# Patient Record
Sex: FEMALE | Race: WHITE | NOT HISPANIC OR LATINO | Employment: STUDENT | URBAN - METROPOLITAN AREA
[De-identification: names, ages, dates, MRNs, and addresses within clinical notes are randomized per-mention and may not be internally consistent; named-entity substitution may affect disease eponyms.]

---

## 2024-05-14 ENCOUNTER — THERAPY VISIT (OUTPATIENT)
Dept: PHYSICAL THERAPY | Facility: CLINIC | Age: 34
End: 2024-05-14
Payer: COMMERCIAL

## 2024-05-14 DIAGNOSIS — M25.561 RIGHT ANTERIOR KNEE PAIN: Primary | ICD-10-CM

## 2024-05-14 PROCEDURE — 97110 THERAPEUTIC EXERCISES: CPT | Mod: GP

## 2024-05-14 PROCEDURE — 97161 PT EVAL LOW COMPLEX 20 MIN: CPT | Mod: GP

## 2024-05-14 ASSESSMENT — ACTIVITIES OF DAILY LIVING (ADL)
KNEEL ON THE FRONT OF YOUR KNEE: ACTIVITY IS NOT DIFFICULT
KNEE_ACTIVITY_OF_DAILY_LIVING_SCORE: 92.86
PAIN: I HAVE THE SYMPTOM BUT IT DOES NOT AFFECT MY ACTIVITY
AS_A_RESULT_OF_YOUR_KNEE_INJURY,_HOW_WOULD_YOU_RATE_YOUR_CURRENT_LEVEL_OF_DAILY_ACTIVITY?: NORMAL
WALK: ACTIVITY IS NOT DIFFICULT
GO DOWN STAIRS: ACTIVITY IS NOT DIFFICULT
WEAKNESS: I DO NOT HAVE THE SYMPTOM
HOW_WOULD_YOU_RATE_THE_OVERALL_FUNCTION_OF_YOUR_KNEE_DURING_YOUR_USUAL_DAILY_ACTIVITIES?: NEARLY NORMAL
SIT WITH YOUR KNEE BENT: ACTIVITY IS NOT DIFFICULT
HOW_WOULD_YOU_RATE_THE_OVERALL_FUNCTION_OF_YOUR_KNEE_DURING_YOUR_USUAL_DAILY_ACTIVITIES?: NEARLY NORMAL
AS_A_RESULT_OF_YOUR_KNEE_INJURY,_HOW_WOULD_YOU_RATE_YOUR_CURRENT_LEVEL_OF_DAILY_ACTIVITY?: NORMAL
LIMPING: I DO NOT HAVE THE SYMPTOM
SWELLING: I HAVE THE SYMPTOM BUT IT DOES NOT AFFECT MY ACTIVITY
SQUAT: ACTIVITY IS MINIMALLY DIFFICULT
SIT WITH YOUR KNEE BENT: ACTIVITY IS NOT DIFFICULT
STIFFNESS: I HAVE THE SYMPTOM BUT IT DOES NOT AFFECT MY ACTIVITY
KNEE_ACTIVITY_OF_DAILY_LIVING_SUM: 65
STAND: ACTIVITY IS NOT DIFFICULT
GO DOWN STAIRS: ACTIVITY IS NOT DIFFICULT
PAIN: I HAVE THE SYMPTOM BUT IT DOES NOT AFFECT MY ACTIVITY
HOW_WOULD_YOU_RATE_THE_CURRENT_FUNCTION_OF_YOUR_KNEE_DURING_YOUR_USUAL_DAILY_ACTIVITIES_ON_A_SCALE_FROM_0_TO_100_WITH_100_BEING_YOUR_LEVEL_OF_KNEE_FUNCTION_PRIOR_TO_YOUR_INJURY_AND_0_BEING_THE_INABILITY_TO_PERFORM_ANY_OF_YOUR_USUAL_DAILY_ACTIVITIES?: 90
STAND: ACTIVITY IS NOT DIFFICULT
GIVING WAY, BUCKLING OR SHIFTING OF KNEE: I DO NOT HAVE THE SYMPTOM
WALK: ACTIVITY IS NOT DIFFICULT
SWELLING: I HAVE THE SYMPTOM BUT IT DOES NOT AFFECT MY ACTIVITY
LIMPING: I DO NOT HAVE THE SYMPTOM
GIVING WAY, BUCKLING OR SHIFTING OF KNEE: I DO NOT HAVE THE SYMPTOM
SQUAT: ACTIVITY IS MINIMALLY DIFFICULT
RISE FROM A CHAIR: ACTIVITY IS NOT DIFFICULT
WEAKNESS: I DO NOT HAVE THE SYMPTOM
PLEASE_INDICATE_YOR_PRIMARY_REASON_FOR_REFERRAL_TO_THERAPY:: KNEE
HOW_WOULD_YOU_RATE_THE_CURRENT_FUNCTION_OF_YOUR_KNEE_DURING_YOUR_USUAL_DAILY_ACTIVITIES_ON_A_SCALE_FROM_0_TO_100_WITH_100_BEING_YOUR_LEVEL_OF_KNEE_FUNCTION_PRIOR_TO_YOUR_INJURY_AND_0_BEING_THE_INABILITY_TO_PERFORM_ANY_OF_YOUR_USUAL_DAILY_ACTIVITIES?: 90
GO UP STAIRS: ACTIVITY IS MINIMALLY DIFFICULT
RAW_SCORE: 65
STIFFNESS: I HAVE THE SYMPTOM BUT IT DOES NOT AFFECT MY ACTIVITY
KNEEL ON THE FRONT OF YOUR KNEE: ACTIVITY IS NOT DIFFICULT
GO UP STAIRS: ACTIVITY IS MINIMALLY DIFFICULT
RISE FROM A CHAIR: ACTIVITY IS NOT DIFFICULT

## 2024-05-14 NOTE — PROGRESS NOTES
PHYSICAL THERAPY EVALUATION  Type of Visit: Evaluation    See electronic medical record for Abuse and Falls Screening details.    Subjective       Presenting condition or subjective complaint: knee pain  Patient presents self-referred to physical therapy with complaints of right>left knee pain. No history of knee issues until this last December. She started doing kickboxing work out videos at home that involved a lot of squatting, lunging, jumping. Knees would be fine during the exercises, but would then hurt sitting afterward. Has since added in more strength training, yoga, less kickboxing videos, decreasing ROM with squats. Joined kickboxing gym, they have helped tweaked her form, this has also helped. Would like to be able to continue to exercise at a high intensity without knee pain, would like to know if there are things to avoid or other things to work on.     Date of onset:      Relevant medical history:     Dates & types of surgery: breast reduction surgery 2014    Prior diagnostic imaging/testing results:       Prior therapy history for the same diagnosis, illness or injury: No      Prior Level of Function  Transfers: Independent  Ambulation: Independent  ADL: Independent  IADL:  Independent    Living Environment  Social support: With a significant other or spouse   Type of home: House; Multi-level; Basement   Stairs to enter the home: Yes       Ramp: No   Stairs inside the home: Yes 20 Is there a railing: Yes   Help at home: None  Equipment owned:       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  Hobbies/Interests: exercize, travel, reading, movies    Patient goals for therapy: walk up stairs and run without pain, expand my ability to exercize withiut pain afterward    Pain assessment: See objective evaluation for additional pain details     Objective   KEY FINDINGS:  Impaired SL squat form  Good LE strength  Full and pain free knee ROM    KNEE EVALUATION  PAIN:  Pain Level at Rest: 0/10  Pain Level with Use: 4/10  Pain Location: around knee cap  Pain Quality: Aching and Sharp  Other symptoms: some clicking/grinding on left knee  Pain Frequency: intermittent  Time dependent: no  Pain is Exacerbated By: going up stairs, running without warming up  Pain is Relieved By: cold  Pain Progression: Improved since stopping kickboxing videos and adding more variety into exercises  INTEGUMENTARY (edema, incisions):   No joint effusion noted  POSTURE:   GAIT:  Weightbearing Status:   Assistive Device(s):   Gait Deviations: WNL  BALANCE/PROPRIOCEPTION: WNL  WEIGHTBEARING ALIGNMENT: WNL, mild pes planus B  NON-WEIGHTBEARING ALIGNMENT:   ROM:   Lower Extremity ROM   Left (PROM) Left (AROM) Right (PROM) Right (AROM)   Hip Flex WNL  WNL    Hip Ext       Hip ER WNL  WNL    Hip IR WNL  WNL    Hip ABD       Hip ADD       Knee ROM  5-0-140, no pain OP  5-0-140, no pain OP     STRENGTH:   Lower Extremity Muscle Strength   Left Right   Hip Flex 5 5   Hip Ext 5 5   Hip ER 5- 5-   Hip IR 5- 5-   Hip ABD 5 5   Hip ADD     Knee Ext 5 5   Knee Flex 5 5   Quad Set Normal Normal   *indicates pain    FLEXIBILITY:  quad and hamstring WNL  SPECIAL TESTS:  Negative patellar compression, varus/vaglus and McMurrays  FUNCTIONAL TESTS: Double Leg Squat: Good technique/no significant findings, Able to perform full deep squat without pain, and however typically avoids deep squats as they are bothersome with repetition  Single Leg Squat: Anterior knee translation, Knee valgus, Hip internal rotation, Improper use of glutes/hips, and mild discomfort R knee  PALPATION: WNL  JOINT MOBILITY:  normal patellar tracking w/ OKC knee extension, mild lateral tilt noted R>L patella      Assessment & Plan   CLINICAL IMPRESSIONS  Medical Diagnosis:      Treatment Diagnosis: R>L anterior knee pain   Impression/Assessment: Patient is a 34 year old female with R knee complaints.  The following significant findings have been  identified: Pain, Decreased ROM/flexibility, Decreased strength, Decreased proprioception, and Decreased activity tolerance. These impairments interfere with their ability to perform recreational activities, household chores, household mobility, and community mobility as compared to previous level of function.     Clinical Decision Making (Complexity):  Clinical Presentation: Stable/Uncomplicated  Clinical Presentation Rationale: based on medical and personal factors listed in PT evaluation  Clinical Decision Making (Complexity): Low complexity    PLAN OF CARE  Treatment Interventions:  Modalities: Dry Needling  Interventions: Gait Training, Manual Therapy, Neuromuscular Re-education, Therapeutic Activity, Therapeutic Exercise, Self-Care/Home Management    Long Term Goals     PT Goal 1  Goal Identifier: Exercise  Goal Description: Patient will be able to perform full exercise routine without modification without knee pain in order to maintain healthy and active lifestyle  Target Date: 07/09/24  PT Goal 2  Goal Identifier: Stairs  Goal Description: Patient will be able to ascend stairs w/o knee pain, reciprocal pattern, no use of rail  Rationale: to maximize safety and independence with performance of ADLs and functional tasks;to maximize safety and independence within the home;to maximize safety and independence within the community  Target Date: 07/09/24      Frequency of Treatment: 1x/week  Duration of Treatment: 8 weeks    Recommended Referrals to Other Professionals:  none  Education Assessment:   Learner/Method: Patient;Demonstration;Pictures/Video;No Barriers to Learning    Risks and benefits of evaluation/treatment have been explained.   Patient/Family/caregiver agrees with Plan of Care.     Evaluation Time:     PT Eval, Low Complexity Minutes (83935): 25       Signing Clinician: Janette Gomez PT

## 2024-05-21 ENCOUNTER — THERAPY VISIT (OUTPATIENT)
Dept: PHYSICAL THERAPY | Facility: CLINIC | Age: 34
End: 2024-05-21
Payer: COMMERCIAL

## 2024-05-21 DIAGNOSIS — M25.561 RIGHT ANTERIOR KNEE PAIN: Primary | ICD-10-CM

## 2024-05-21 PROCEDURE — 97110 THERAPEUTIC EXERCISES: CPT | Mod: GP

## 2024-06-05 ENCOUNTER — THERAPY VISIT (OUTPATIENT)
Dept: PHYSICAL THERAPY | Facility: CLINIC | Age: 34
End: 2024-06-05
Payer: COMMERCIAL

## 2024-06-05 DIAGNOSIS — M25.561 RIGHT ANTERIOR KNEE PAIN: Primary | ICD-10-CM

## 2024-06-05 PROCEDURE — 97110 THERAPEUTIC EXERCISES: CPT | Mod: GP

## 2024-06-18 ENCOUNTER — THERAPY VISIT (OUTPATIENT)
Dept: PHYSICAL THERAPY | Facility: CLINIC | Age: 34
End: 2024-06-18
Payer: COMMERCIAL

## 2024-06-18 DIAGNOSIS — M25.561 RIGHT ANTERIOR KNEE PAIN: Primary | ICD-10-CM

## 2024-06-18 PROCEDURE — 97110 THERAPEUTIC EXERCISES: CPT | Mod: GP

## 2024-07-16 ENCOUNTER — THERAPY VISIT (OUTPATIENT)
Dept: PHYSICAL THERAPY | Facility: CLINIC | Age: 34
End: 2024-07-16
Payer: COMMERCIAL

## 2024-07-16 DIAGNOSIS — M25.561 RIGHT ANTERIOR KNEE PAIN: Primary | ICD-10-CM

## 2024-07-16 PROCEDURE — 97110 THERAPEUTIC EXERCISES: CPT | Mod: GP

## 2024-07-16 NOTE — PROGRESS NOTES
07/16/24 0500   Appointment Info   Signing clinician's name / credentials Janette Gomez DPT/Mor Schwarz, SPT   Total/Authorized Visits 8 POC   Visits Used 5   PT Tx Diagnosis R>L anterior knee pain   Quick Adds Self Referred   Self Referred   Self Referred (PT) Yes   MD order needed by: 8/12/2024   Progress Note/Certification   Start of Care Date 05/14/24   Therapy Frequency 1x/week   Predicted Duration 8 weeks   Progress Note Due Date 07/13/24   Progress Note Completed Date 05/14/24   GOALS   PT Goals 2   PT Goal 1   Goal Identifier Exercise   Goal Description Patient will be able to perform full exercise routine without modification without knee pain in order to maintain healthy and active lifestyle   Goal Progress met   Target Date 07/09/24   Date Met 07/16/24   PT Goal 2   Goal Identifier Stairs   Goal Description Patient will be able to ascend stairs w/o knee pain, reciprocal pattern, no use of rail   Rationale to maximize safety and independence with performance of ADLs and functional tasks;to maximize safety and independence within the home;to maximize safety and independence within the community   Target Date 07/09/24   Date Met 06/18/24   Subjective Report   Subjective Report Has been keeping track of her exercises that she has been doing to try and narrow down what has been aggravating her knee. She beleives it is from increasing weight on her workouts. Still frustrated with the pain because she feels that she should be able to do more reps and weight. She has been continuing with her standard weight or adding a few pounds or reps to increase the load. Has had trouble getting in the PT workputs due to trips and getting busy. Sumo squats are the only thing she did that day that was out of the ordinary that caused more pain.   Objective Measures   Objective Measures Objective Measure 1   Objective Measure 1   Details Pt demonstrated slight valgus on R and L with single leg squat with no pain.  Double leg squat was good with no pain.   Treatment Interventions (PT)   Interventions Therapeutic Procedure/Exercise   Therapeutic Procedure/Exercise   Therapeutic Procedures: strength, endurance, ROM, flexibility minutes (43911) 25   Ther Proc 1 Prone hip extension with bent knee   Ther Proc 1 - Details x5 reps bilaterally. VC for proper form and where the contraction should be felt.   PTRx Ther Proc 1 Wall Sit   PTRx Ther Proc 1 - Details VR   PTRx Ther Proc 2 Lateral Step Down   PTRx Ther Proc 2 - Details VR - edu to add depth or weight   PTRx Ther Proc 3 Dumbbell Lunges - Split Squats   PTRx Ther Proc 3 - Details discussed   PTRx Ther Proc 4 Lateral Lunges   PTRx Ther Proc 4 - Details VR   PTRx Ther Proc 5 Functional Lunge Backward   PTRx Ther Proc 5 - Details VR   PTRx Ther Proc 6 Split Squat Goblet with One Kettlebell   PTRx Ther Proc 6 - Details VR   PTRx Ther Proc 7 Sumo Squats   PTRx Ther Proc 7 - Details VR   PTRx Ther Proc 8 Single Leg Bridge   PTRx Ther Proc 8 - Details VR   PTRx Ther Proc 9 Hip Thrust   PTRx Ther Proc 9 - Details Reviewed, discussed change in weight distribution to help with comfort.   PTRx Ther Proc 10 Congolese Deadlift Single    PTRx Ther Proc 10 - Details 10x ROSALVA 10#. Pt was curious on form, cueing and instruction for how to hang onto the weight.   PTRx Ther Proc 11 Deadlift with Barbell   PTRx Ther Proc 11 - Details 10x 20# w/ dumbbells 10x w/ bar from raised height cues for form   PTRx Ther Proc 12 Congolese Deadlift   PTRx Ther Proc 12 - Details Reviewed   PTRx Ther Proc 13 Side Stepping With Theraband   PTRx Ther Proc 13 - Details VR   PTRx Ther Proc 14 Reverse Clamshell   PTRx Ther Proc 14 - Details x10. Pt required VC for proper mechanics   PTRx Ther Proc 15 Clamshell with Theraband   PTRx Ther Proc 15 - Details SL clam bridges 10x ROSALVA   PTRx Ther Proc 16 Hip AROM Standing Abduction   PTRx Ther Proc 16 - Details NT   PTRx Ther Proc 17 Soft Tissue Unloading   PTRx Ther Proc 17 -  "Details NT   Skilled Intervention Education on purpose/benefit of HEP based on patient history and exam findings. Patient should not push into pain with exercises, education on how to adjust program based on how it feels and goals of strengthening/ROM for increased function and pain relief. Cues for proper positioning.   Ther Proc 9 Hip extension on table   Ther Proc 9 - Details x10 reps completed bilaterally. VC for form   Ther Proc 10 Sinhala Split Squats   Ther Proc 10 - Details x10 reps bilaterally   Therapeutic Procedures Ther Proc 9;Ther Proc 10   Therapeutic Activity   PTRx Ther Act 1 Patella Taping Preparation   PTRx Ther Act 1 - Details NT   PTRx Ther Act 2 Education Sheet General   PTRx Ther Act 2 - Details Strength training principles:-You can gain strength by doing an exercise/training a muscle group 2x/week-General set/rep scheme: 3-4 sets of 8-15 reps with rest breaks between sets. You may fully rest (i.e. sit/drink water) or \"super set\" your exercises and do one lower body exercise and then one upper body exercise while you rest your legs/vice versa.-For most efficient training/strength gains you should be tired at the end of each set. If huge strength gains/muscle hypertrophy are not your top priority then I would suggest at least being fatigued by the end of your third set. -An effective way to monitor workload is RPE (rate of perceived exertion) on a 0-10 scale 10 being impossible for you to work any harder or do another rep. Ideally you would be around 6-8/10 by the end of your sets assuming this is not impacting your knee pain. -I would chose 2 squat/lunge variations 1-2 hip hinge variations and 2 accessory exercise for a leg workout.    Education   Learner/Method Patient;Demonstration;Pictures/Video;No Barriers to Learning   Plan   Home program online   Updates to plan of care added Armenian split squats and glute kickbacks   Plan for next session discharge   Total Session Time   Timed Code " Treatment Minutes 25   Total Treatment Time (sum of timed and untimed services) 25       DISCHARGE  Reason for Discharge: Patient has met all goals.    Equipment Issued: None    Discharge Plan: Patient to continue home program.    Referring Provider:  Referred Self

## 2024-11-25 ENCOUNTER — THERAPY VISIT (OUTPATIENT)
Dept: PHYSICAL THERAPY | Facility: CLINIC | Age: 34
End: 2024-11-25
Payer: COMMERCIAL

## 2024-11-25 DIAGNOSIS — M25.511 ACUTE PAIN OF RIGHT SHOULDER: Primary | ICD-10-CM

## 2024-11-25 PROCEDURE — 97161 PT EVAL LOW COMPLEX 20 MIN: CPT | Mod: GP | Performed by: PHYSICAL THERAPIST

## 2024-11-25 PROCEDURE — 97112 NEUROMUSCULAR REEDUCATION: CPT | Mod: GP | Performed by: PHYSICAL THERAPIST

## 2024-11-25 ASSESSMENT — ACTIVITIES OF DAILY LIVING (ADL)
TOUCHING_THE_BACK_OF_YOUR_NECK: 0
REMOVING_SOMETHING_FROM_YOUR_BACK_POCKET: 1
WASHING_YOUR_BACK: 1
REACHING_FOR_SOMETHING_ON_A_HIGH_SHELF: 2
AT_ITS_WORST?: 4
PUTTING_ON_A_SHIRT_THAT_BUTTONS_DOWN_THE_FRONT: 0
CARRYING_A_HEAVY_OBJECT_OF_10_POUNDS: 0
PUTTING_ON_AN_UNDERSHIRT_OR_A_PULLOVER_SWEATER: 2
PLEASE_INDICATE_YOR_PRIMARY_REASON_FOR_REFERRAL_TO_THERAPY:: SHOULDER
PUTTING_ON_YOUR_PANTS: 0
WHEN_LYING_ON_THE_INVOLVED_SIDE: 1
PUSHING_WITH_THE_INVOLVED_ARM: 2
PLACING_AN_OBJECT_ON_A_HIGH_SHELF: 2
WASHING_YOUR_HAIR?: 1

## 2024-11-25 NOTE — PROGRESS NOTES
PHYSICAL THERAPY EVALUATION  Type of Visit: Evaluation              Subjective         Presenting condition or subjective complaint: (Patient-Rptd) shoulder pain. Pt presents to PT with a chief complaint of R anterior-lateral shoulder pain with reported onset with doing push ups on 10/9/24 ( style, arms close to body). Pain worsened later  that week with another pushing exercise. Pt avoided pushing exercises with gradual improvement in pain during daily activities, however patient still unable to engage in strength training. Pain worse with reaching across body, pushing, and lifting overhead.     Date of onset: 10/09/24    Relevant medical history:     Dates & types of surgery: (Patient-Rptd) brest reduction surgery 2014    Prior diagnostic imaging/testing results:       Prior therapy history for the same diagnosis, illness or injury: (Patient-Rptd) No          Living Environment  Social support: (Patient-Rptd) With a significant other or spouse   Type of home: (Patient-Rptd) House   Stairs to enter the home:         Ramp: (Patient-Rptd) No   Stairs inside the home: (Patient-Rptd) Yes (Patient-Rptd) 25 Is there a railing: (Patient-Rptd) Yes     Help at home: (Patient-Rptd) None  Equipment owned:       Employment: (Patient-Rptd) Yes (Patient-Rptd) research assistant,graduate instructor  Hobbies/Interests: (Patient-Rptd) travel,books, movies    Patient goals for therapy: (Patient-Rptd) return to regular upper body exercise routine,eliminte pain forreguar daily activities    Pain assessment: See objective evaluation for additional pain details     Objective   SHOULDER EVALUATION  PAIN: Pain Level at Rest: 1/10  Pain Level with Use: 4/10  Pain Location: R lateral and R anterior shoulder  Pain Quality: Aching and Sharp  Pain Frequency: intermittent sharp, occasional ache  Pain is Worst: nighttime  Pain is Exacerbated By: reaching across body, lifting overhead, pushing  Pain is Relieved By: cold and rest  Pain  Progression: Improved  INTEGUMENTARY (edema, incisions):   POSTURE: Sitting Posture: Forward head  GAIT:   Weightbearing Status:   Assistive Device(s):   Gait Deviations:   BALANCE/PROPRIOCEPTION:   WEIGHTBEARING ALIGNMENT:   ROM:   (Degrees) Left AROM Left PROM Right AROM  Right PROM   Shoulder Flexion WNL  WNL    Shoulder Extension       Shoulder Abduction WNL  WNL, pain + at end range     Shoulder Adduction       Shoulder Internal Rotation WNL   WNL, pain + at end range     Shoulder External Rotation WNL   WNL    Shoulder Horizontal Abduction       Shoulder Horizontal Adduction       Shoulder Flexion ER       Shoulder Flexion IR       Elbow Extension       Elbow Flexion       Pain:   End feel:     STRENGTH:   Pain: - none + mild ++ moderate +++ severe  Strength Scale: 0-5/5 Left Right   Shoulder Flexion 5 5, + (mild)   Shoulder Extension     Shoulder Abduction 5 5, + (mild)   Shoulder Adduction     Shoulder Internal Rotation 5 5   Shoulder External Rotation 5 5   Shoulder Horizontal Abduction     Shoulder Horizontal Adduction     Elbow Flexion     Elbow Extension     Mid Trap 4 4   Lower Trap 4 4   Rhomboid     Serratus Anterior       FLEXIBILITY:   SPECIAL TESTS:    Left Right   Impingement     Neer's     Hawkin's-Koko  Positive   Coracoid Impingement  Positive   Internal impingement     Labral     Anterior Slide     Martin's     Crank     Instability     Apprehension (anterior)     Relocation (anterior)     Anterior Load & Shift     Posterior Load & Shift     Posterior instability (with 90 degrees flex)     Multi-Directional Instability      Sulcus     Biceps      Speed's     Rotator Cuff Tear     Drop Arm     Belly Press     Lift off        PALPATION:  TTP at R biceps and supraspinatus tendon  JOINT MOBILITY:   CERVICAL SCREEN: WFL    Assessment & Plan   CLINICAL IMPRESSIONS  Medical Diagnosis: R shoulder pain    Treatment Diagnosis: R shoulder pain   Impression/Assessment: Patient is a 34 year old female  with R shoulder complaints.  The following significant findings have been identified: Pain, Decreased ROM/flexibility, Decreased strength, and Impaired muscle performance. These impairments interfere with their ability to perform self care tasks, recreational activities, and household chores as compared to previous level of function.     Clinical Decision Making (Complexity):  Clinical Presentation: Stable/Uncomplicated  Clinical Presentation Rationale: based on medical and personal factors listed in PT evaluation  Clinical Decision Making (Complexity): Low complexity    PLAN OF CARE  Treatment Interventions:  Interventions: Manual Therapy, Neuromuscular Re-education, Therapeutic Activity, Therapeutic Exercise    Long Term Goals     PT Goal 1  Goal Identifier: Reaching  Goal Description: Pt will be able to reach across her body and behind back w/o pain for hygiene and dressing  Rationale: to maximize safety and independence with performance of ADLs and functional tasks;to maximize safety and independence within the home;to maximize safety and independence with self cares  Goal Progress: pain 4/10 with reaching  Target Date: 01/20/25      Frequency of Treatment: 1x week  Duration of Treatment: 4 weeks then 2 x month for 1month    Recommended Referrals to Other Professionals:   Education Assessment:   Learner/Method: Patient;No Barriers to Learning    Risks and benefits of evaluation/treatment have been explained.   Patient/Family/caregiver agrees with Plan of Care.     Evaluation Time:     RETIRED PT Eval, Low Complexity Minutes (95799): 25       Signing Clinician: Mark Buenrostro PT

## 2024-11-26 PROBLEM — M25.511 ACUTE PAIN OF RIGHT SHOULDER: Status: ACTIVE | Noted: 2024-11-26

## 2024-12-04 ENCOUNTER — THERAPY VISIT (OUTPATIENT)
Dept: PHYSICAL THERAPY | Facility: CLINIC | Age: 34
End: 2024-12-04
Payer: COMMERCIAL

## 2024-12-04 DIAGNOSIS — M25.511 ACUTE PAIN OF RIGHT SHOULDER: Primary | ICD-10-CM

## 2024-12-04 PROCEDURE — 97110 THERAPEUTIC EXERCISES: CPT | Mod: GP | Performed by: PHYSICAL THERAPIST

## 2024-12-04 PROCEDURE — 97112 NEUROMUSCULAR REEDUCATION: CPT | Mod: GP | Performed by: PHYSICAL THERAPIST

## 2024-12-31 ENCOUNTER — THERAPY VISIT (OUTPATIENT)
Dept: PHYSICAL THERAPY | Facility: CLINIC | Age: 34
End: 2024-12-31
Payer: COMMERCIAL

## 2024-12-31 DIAGNOSIS — M25.511 ACUTE PAIN OF RIGHT SHOULDER: Primary | ICD-10-CM

## 2024-12-31 PROCEDURE — 97110 THERAPEUTIC EXERCISES: CPT | Mod: GP | Performed by: PHYSICAL THERAPIST

## 2024-12-31 PROCEDURE — 97112 NEUROMUSCULAR REEDUCATION: CPT | Mod: GP | Performed by: PHYSICAL THERAPIST
